# Patient Record
Sex: OTHER/UNKNOWN | URBAN - METROPOLITAN AREA
[De-identification: names, ages, dates, MRNs, and addresses within clinical notes are randomized per-mention and may not be internally consistent; named-entity substitution may affect disease eponyms.]

---

## 2019-01-18 ENCOUNTER — APPOINTMENT (OUTPATIENT)
Dept: URBAN - METROPOLITAN AREA CLINIC 290 | Age: 19
Setting detail: DERMATOLOGY
End: 2019-01-24

## 2019-01-18 DIAGNOSIS — L73.2 HIDRADENITIS SUPPURATIVA: ICD-10-CM

## 2019-01-18 PROCEDURE — 99202 OFFICE O/P NEW SF 15 MIN: CPT | Mod: 25

## 2019-01-18 PROCEDURE — OTHER MIPS QUALITY: OTHER

## 2019-01-18 PROCEDURE — 11900 INJECT SKIN LESIONS </W 7: CPT

## 2019-01-18 PROCEDURE — OTHER ORDER TESTS: OTHER

## 2019-01-18 PROCEDURE — OTHER TREATMENT REGIMEN: OTHER

## 2019-01-18 PROCEDURE — OTHER COUNSELING: OTHER

## 2019-01-18 PROCEDURE — OTHER INTRALESIONAL KENALOG: OTHER

## 2019-01-18 PROCEDURE — OTHER OTHER: OTHER

## 2019-01-18 PROCEDURE — OTHER PRESCRIPTION: OTHER

## 2019-01-18 RX ORDER — MINOCYCLINE HYDROCHLORIDE 50 MG/1
CAPSULE ORAL
Qty: 70 | Refills: 1 | Status: ERX | COMMUNITY
Start: 2019-01-18

## 2019-01-18 RX ORDER — SPIRONOLACTONE 50 MG/1
TABLET, FILM COATED ORAL
Qty: 60 | Refills: 0 | Status: ERX | COMMUNITY
Start: 2019-01-18

## 2019-01-18 RX ORDER — CLINDAMYCIN PHOSPHATE 10 MG/ML
LOTION TOPICAL
Qty: 120 | Refills: 2 | Status: ERX | COMMUNITY
Start: 2019-01-18

## 2019-01-18 ASSESSMENT — LOCATION SIMPLE DESCRIPTION DERM: LOCATION SIMPLE: GROIN

## 2019-01-18 ASSESSMENT — LOCATION DETAILED DESCRIPTION DERM
LOCATION DETAILED: LEFT INGUINAL CREASE
LOCATION DETAILED: RIGHT INGUINAL CREASE

## 2019-01-18 ASSESSMENT — LOCATION ZONE DERM: LOCATION ZONE: TRUNK

## 2019-01-18 NOTE — PROCEDURE: TREATMENT REGIMEN
Plan: Acne 5 wash daily (sold in office)\\nNiacinomide 500mg - take one pill twice daily.  ([facility], [employer], or [employer])\\n\\nRecommended laser hair removal Plan: Acne 5 wash daily (sold in office)\\nNiacinomide 500mg - take one pill twice daily.  ([facility], [employer], or [employer])\\n\\nRecommended laser hair removal

## 2019-01-18 NOTE — PROCEDURE: OTHER
Detail Level: Detailed
Note Text (......Xxx Chief Complaint.): This diagnosis correlates with the
Other (Free Text): Stressed importance of being on a birth control And avoiding pregnancy while taking Aldactone.\\n\\nDHEAS, Free testosterone, zinc, vitamin D, SHBG\\n\\nOk to send in 5 months of Aldactone pending normal labs.

## 2019-01-18 NOTE — PROCEDURE: INTRALESIONAL KENALOG
Detail Level: Detailed
Include Z78.9 (Other Specified Conditions Influencing Health Status) As An Associated Diagnosis?: No
Kenalog Preparation: Kenalog
Total Volume Injected (Ccs- Only Use Numbers And Decimals): 2
Consent: Warned of risk of scar, infection, atrophy
X Size Of Lesion In Cm (Optional): 0
Medical Necessity Clause: This procedure was medically necessary because the lesions that were treated were: severely itchy
Concentration Of Solution Injected (Mg/Ml): 5.0
Administered By (Optional): Dr. Smith

## 2019-02-01 ENCOUNTER — APPOINTMENT (OUTPATIENT)
Dept: URBAN - METROPOLITAN AREA CLINIC 290 | Age: 19
Setting detail: DERMATOLOGY
End: 2019-02-01

## 2019-02-01 DIAGNOSIS — L30.0 NUMMULAR DERMATITIS: ICD-10-CM

## 2019-02-01 DIAGNOSIS — L73.2 HIDRADENITIS SUPPURATIVA: ICD-10-CM

## 2019-02-01 PROCEDURE — OTHER COUNSELING: OTHER

## 2019-02-01 PROCEDURE — 11900 INJECT SKIN LESIONS </W 7: CPT

## 2019-02-01 PROCEDURE — 99213 OFFICE O/P EST LOW 20 MIN: CPT | Mod: 25

## 2019-02-01 PROCEDURE — OTHER PRESCRIPTION: OTHER

## 2019-02-01 PROCEDURE — OTHER INTRALESIONAL KENALOG: OTHER

## 2019-02-01 PROCEDURE — OTHER TREATMENT REGIMEN: OTHER

## 2019-02-01 PROCEDURE — OTHER OTHER: OTHER

## 2019-02-01 RX ORDER — SPIRONOLACTONE 50 MG/1
TABLET, FILM COATED ORAL
Qty: 60 | Refills: 4 | Status: ERX

## 2019-02-01 RX ORDER — TRIAMCINOLONE ACETONIDE 1 MG/G
CREAM TOPICAL
Qty: 30 | Refills: 1 | Status: ERX | COMMUNITY
Start: 2019-02-01

## 2019-02-01 ASSESSMENT — LOCATION DETAILED DESCRIPTION DERM
LOCATION DETAILED: RIGHT INGUINAL CREASE
LOCATION DETAILED: LEFT INGUINAL CREASE
LOCATION DETAILED: LEFT ANTERIOR PROXIMAL THIGH

## 2019-02-01 ASSESSMENT — LOCATION ZONE DERM
LOCATION ZONE: TRUNK
LOCATION ZONE: LEG

## 2019-02-01 ASSESSMENT — LOCATION SIMPLE DESCRIPTION DERM
LOCATION SIMPLE: LEFT THIGH
LOCATION SIMPLE: GROIN

## 2019-02-01 NOTE — PROCEDURE: TREATMENT REGIMEN
Plan: Acne 5 wash daily (sold in office)\\nNiacinomide 500mg - take one pill twice daily.  ([employer], [employer], or [employer])\\nMinocycline 50mg - take one tablet twice daily.  Take with food \\nClindamycin lotion - apply twice daily\\n\\nStressed importance of being on a birth control And avoiding pregnancy while taking Aldactone.\\n\\nOk to send in 5 months of Aldactone pending normal labs. Plan: Acne 5 wash daily (sold in office)\\nNiacinomide 500mg - take one pill twice daily.  ([employer], [employer], or [employer])\\nMinocycline 50mg - take one tablet twice daily.  Take with food \\nClindamycin lotion - apply twice daily\\n\\nStressed importance of being on a birth control And avoiding pregnancy while taking Aldactone.\\n\\nOk to send in 5 months of Aldactone pending normal labs.

## 2019-02-01 NOTE — PROCEDURE: INTRALESIONAL KENALOG
Kenalog Preparation: Kenalog
Concentration Of Solution Injected (Mg/Ml): 5.0
Total Volume Injected (Ccs- Only Use Numbers And Decimals): 0.5
Administered By (Optional): Dr. Smith
Detail Level: Detailed
X Size Of Lesion In Cm (Optional): 0
Include Z78.9 (Other Specified Conditions Influencing Health Status) As An Associated Diagnosis?: No
Medical Necessity Clause: This procedure was medically necessary because the lesions that were treated were: severely itchy
Consent: Warned of risk of scar, infection, atrophy

## 2019-02-13 ENCOUNTER — APPOINTMENT (OUTPATIENT)
Dept: URBAN - METROPOLITAN AREA CLINIC 290 | Age: 19
Setting detail: DERMATOLOGY
End: 2019-02-13

## 2019-02-13 DIAGNOSIS — L73.2 HIDRADENITIS SUPPURATIVA: ICD-10-CM

## 2019-02-13 PROCEDURE — OTHER INTRALESIONAL KENALOG: OTHER

## 2019-02-13 PROCEDURE — OTHER COUNSELING: OTHER

## 2019-02-13 PROCEDURE — OTHER TREATMENT REGIMEN: OTHER

## 2019-02-13 PROCEDURE — 11900 INJECT SKIN LESIONS </W 7: CPT

## 2019-02-13 PROCEDURE — OTHER PRESCRIPTION: OTHER

## 2019-02-13 RX ORDER — MINOCYCLINE HYDROCHLORIDE 50 MG/1
CAPSULE ORAL
Qty: 60 | Refills: 1 | Status: ERX

## 2019-02-13 ASSESSMENT — LOCATION SIMPLE DESCRIPTION DERM: LOCATION SIMPLE: GROIN

## 2019-02-13 ASSESSMENT — LOCATION ZONE DERM: LOCATION ZONE: TRUNK

## 2019-02-13 ASSESSMENT — LOCATION DETAILED DESCRIPTION DERM
LOCATION DETAILED: LEFT INGUINAL CREASE
LOCATION DETAILED: RIGHT INGUINAL CREASE

## 2019-02-13 NOTE — PROCEDURE: TREATMENT REGIMEN
Plan: Acne 5 wash daily (sold in office)\\nNiacinomide 500mg - take one pill twice daily.  ([employer], [employer], or [employer])\\nDecrease Minocycline 50mg to once daily.  If flares ok to increase to twice daily.  \\nClindamycin lotion twice daily \\nAldactone 50mg twice daily Plan: Acne 5 wash daily (sold in office)\\nNiacinomide 500mg - take one pill twice daily.  ([facility], [employer], or [employer])\\nDecrease Minocycline 50mg to once daily.  If flares ok to increase to twice daily.  \\nClindamycin lotion twice daily \\nAldactone 50mg twice daily

## 2019-02-13 NOTE — PROCEDURE: INTRALESIONAL KENALOG
Kenalog Preparation: Kenalog
Detail Level: Detailed
Consent: Warned of risk of scar, infection, atrophy
Medical Necessity Clause: This procedure was medically necessary because the lesions that were treated were: severely itchy
Total Volume Injected (Ccs- Only Use Numbers And Decimals): 1
Include Z78.9 (Other Specified Conditions Influencing Health Status) As An Associated Diagnosis?: No
Concentration Of Solution Injected (Mg/Ml): 5.0
X Size Of Lesion In Cm (Optional): 0
Administered By (Optional): Dr. Smith

## 2019-04-04 ENCOUNTER — APPOINTMENT (OUTPATIENT)
Dept: URBAN - METROPOLITAN AREA CLINIC 290 | Age: 19
Setting detail: DERMATOLOGY
End: 2019-04-04

## 2019-04-04 DIAGNOSIS — L73.2 HIDRADENITIS SUPPURATIVA: ICD-10-CM

## 2019-04-04 PROCEDURE — 11900 INJECT SKIN LESIONS </W 7: CPT

## 2019-04-04 PROCEDURE — OTHER TREATMENT REGIMEN: OTHER

## 2019-04-04 PROCEDURE — OTHER COUNSELING: OTHER

## 2019-04-04 PROCEDURE — OTHER INTRALESIONAL KENALOG: OTHER

## 2019-04-04 PROCEDURE — OTHER PRESCRIPTION: OTHER

## 2019-04-04 RX ORDER — MINOCYCLINE HYDROCHLORIDE 50 MG/1
CAPSULE ORAL
Qty: 60 | Refills: 2 | Status: ERX

## 2019-04-04 ASSESSMENT — LOCATION ZONE DERM: LOCATION ZONE: TRUNK

## 2019-04-04 ASSESSMENT — LOCATION DETAILED DESCRIPTION DERM
LOCATION DETAILED: LEFT INGUINAL CREASE
LOCATION DETAILED: RIGHT INGUINAL CREASE

## 2019-04-04 ASSESSMENT — LOCATION SIMPLE DESCRIPTION DERM: LOCATION SIMPLE: GROIN

## 2019-04-04 NOTE — PROCEDURE: TREATMENT REGIMEN
Plan: Acne 5 wash daily (sold in office)\\nNiacinomide 500mg - take one pill twice daily.  ([employer], [employer], or [employer])\\nMinocycline 50mg - take one pill twice daily for 10 days then decrease to once daily.  Take with food.  If flares ok to increase to twice daily until improved.  \\nClindamycin lotion twice daily \\nAldactone 50mg twice daily Plan: Acne 5 wash daily (sold in office)\\nNiacinomide 500mg - take one pill twice daily.  ([employer], [employer], or [employer])\\nMinocycline 50mg - take one pill twice daily for 10 days then decrease to once daily.  Take with food.  If flares ok to increase to twice daily until improved.  \\nClindamycin lotion twice daily \\nAldactone 50mg twice daily

## 2019-04-04 NOTE — PROCEDURE: INTRALESIONAL KENALOG
Total Volume Injected (Ccs- Only Use Numbers And Decimals): 1
Kenalog Preparation: Kenalog
Concentration Of Solution Injected (Mg/Ml): 5.0
Detail Level: Detailed
Administered By (Optional): Dr. Smith
Medical Necessity Clause: This procedure was medically necessary because the lesions that were treated were: severely itchy
Include Z78.9 (Other Specified Conditions Influencing Health Status) As An Associated Diagnosis?: No
X Size Of Lesion In Cm (Optional): 0
Consent: Warned of risk of scar, infection, atrophy

## 2019-04-17 ENCOUNTER — APPOINTMENT (OUTPATIENT)
Dept: URBAN - METROPOLITAN AREA CLINIC 290 | Age: 19
Setting detail: DERMATOLOGY
End: 2019-04-24

## 2019-04-17 DIAGNOSIS — L73.2 HIDRADENITIS SUPPURATIVA: ICD-10-CM

## 2019-04-17 PROCEDURE — OTHER PRESCRIPTION: OTHER

## 2019-04-17 PROCEDURE — OTHER INTRALESIONAL KENALOG: OTHER

## 2019-04-17 PROCEDURE — 11900 INJECT SKIN LESIONS </W 7: CPT

## 2019-04-17 PROCEDURE — OTHER COUNSELING: OTHER

## 2019-04-17 PROCEDURE — OTHER TREATMENT REGIMEN: OTHER

## 2019-04-17 RX ORDER — DOXYCYCLINE HYCLATE 100 MG/1
CAPSULE, GELATIN COATED ORAL
Qty: 28 | Refills: 0 | Status: ERX | COMMUNITY
Start: 2019-04-17

## 2019-04-17 RX ORDER — PREDNISONE 10 MG/1
TABLET ORAL
Qty: 40 | Refills: 0 | Status: ERX | COMMUNITY
Start: 2019-04-17

## 2019-04-17 ASSESSMENT — LOCATION ZONE DERM: LOCATION ZONE: TRUNK

## 2019-04-17 ASSESSMENT — LOCATION DETAILED DESCRIPTION DERM
LOCATION DETAILED: LEFT INGUINAL CREASE
LOCATION DETAILED: RIGHT INGUINAL CREASE

## 2019-04-17 ASSESSMENT — LOCATION SIMPLE DESCRIPTION DERM: LOCATION SIMPLE: GROIN

## 2019-04-17 NOTE — PROCEDURE: INTRALESIONAL KENALOG
Kenalog Preparation: Kenalog
X Size Of Lesion In Cm (Optional): 0
Administered By (Optional): Dr. Smith
Concentration Of Solution Injected (Mg/Ml): 5.0
Consent: Warned of risk of scar, infection, atrophy
Include Z78.9 (Other Specified Conditions Influencing Health Status) As An Associated Diagnosis?: No
Total Volume Injected (Ccs- Only Use Numbers And Decimals): 1
Detail Level: Detailed
Medical Necessity Clause: This procedure was medically necessary because the lesions that were treated were: severely itchy

## 2019-04-17 NOTE — PROCEDURE: TREATMENT REGIMEN
Plan: Acne 5 wash daily (sold in office)\\nNiacinomide 500mg - take one pill twice daily.  ([employer], [employer], or [employer])\\nClindamycin lotion twice daily \\nAldactone 50mg twice daily\\nZinc supplement daily\\n3 pills prednisone 10mg given in office today Plan: Acne 5 wash daily (sold in office)\\nNiacinomide 500mg - take one pill twice daily.  ([employer], [employer], or [employer])\\nClindamycin lotion twice daily \\nAldactone 50mg twice daily\\nZinc supplement daily\\n3 pills prednisone 10mg given in office today

## 2019-05-13 RX ORDER — SPIRONOLACTONE 50 MG/1
TABLET, FILM COATED ORAL
Qty: 60 | Refills: 4 | Status: ERX